# Patient Record
Sex: FEMALE | Race: BLACK OR AFRICAN AMERICAN
[De-identification: names, ages, dates, MRNs, and addresses within clinical notes are randomized per-mention and may not be internally consistent; named-entity substitution may affect disease eponyms.]

---

## 2017-05-02 ENCOUNTER — HOSPITAL ENCOUNTER (INPATIENT)
Dept: HOSPITAL 54 - ER | Age: 62
LOS: 1 days | Discharge: HOME | DRG: 205 | End: 2017-05-03
Attending: INTERNAL MEDICINE | Admitting: INTERNAL MEDICINE
Payer: MEDICARE

## 2017-05-02 VITALS — DIASTOLIC BLOOD PRESSURE: 63 MMHG | SYSTOLIC BLOOD PRESSURE: 115 MMHG

## 2017-05-02 VITALS — SYSTOLIC BLOOD PRESSURE: 99 MMHG | DIASTOLIC BLOOD PRESSURE: 54 MMHG

## 2017-05-02 VITALS — HEIGHT: 64 IN | WEIGHT: 152 LBS | BODY MASS INDEX: 25.95 KG/M2

## 2017-05-02 VITALS — SYSTOLIC BLOOD PRESSURE: 134 MMHG | DIASTOLIC BLOOD PRESSURE: 70 MMHG

## 2017-05-02 VITALS — DIASTOLIC BLOOD PRESSURE: 70 MMHG | SYSTOLIC BLOOD PRESSURE: 132 MMHG

## 2017-05-02 VITALS — SYSTOLIC BLOOD PRESSURE: 118 MMHG | DIASTOLIC BLOOD PRESSURE: 60 MMHG

## 2017-05-02 DIAGNOSIS — N18.6: ICD-10-CM

## 2017-05-02 DIAGNOSIS — D63.8: ICD-10-CM

## 2017-05-02 DIAGNOSIS — E11.22: ICD-10-CM

## 2017-05-02 DIAGNOSIS — I24.9: ICD-10-CM

## 2017-05-02 DIAGNOSIS — M94.0: Primary | ICD-10-CM

## 2017-05-02 DIAGNOSIS — I12.0: ICD-10-CM

## 2017-05-02 DIAGNOSIS — F03.90: ICD-10-CM

## 2017-05-02 DIAGNOSIS — J44.9: ICD-10-CM

## 2017-05-02 DIAGNOSIS — Z99.2: ICD-10-CM

## 2017-05-02 DIAGNOSIS — Z86.73: ICD-10-CM

## 2017-05-02 LAB
APTT PPP: 91 SEC (ref 23–34)
BASOPHILS # BLD AUTO: 0 /CMM (ref 0–0.2)
BASOPHILS NFR BLD AUTO: 0.9 % (ref 0–2)
BUN SERPL-MCNC: 35 MG/DL (ref 7–18)
CALCIUM SERPL-MCNC: 9.2 MG/DL (ref 8.5–10.1)
CHLORIDE SERPL-SCNC: 103 MMOL/L (ref 98–107)
CHOLEST SERPL-MCNC: 213 MG/DL (ref ?–200)
CO2 SERPL-SCNC: 23 MMOL/L (ref 21–32)
CREAT SERPL-MCNC: 2.5 MG/DL (ref 0.6–1.3)
EOSINOPHIL # BLD AUTO: 0.4 /CMM (ref 0–0.7)
EOSINOPHIL NFR BLD AUTO: 6.4 % (ref 0–6)
GFR SERPLBLD BASED ON 1.73 SQ M-ARVRAT: 20 ML/MIN (ref 60–?)
GLUCOSE SERPL-MCNC: 206 MG/DL (ref 74–106)
HCT VFR BLD AUTO: 40 % (ref 33–45)
HDLC SERPL-MCNC: 46 MG/DL (ref 40–60)
HGB BLD-MCNC: 13.1 G/DL (ref 11.5–14.8)
INR PPP: 1.06 (ref 0.87–1.13)
LDLC SERPL DIRECT ASSAY-MCNC: 113 MG/DL (ref 0–99)
LYMPHOCYTES NFR BLD AUTO: 1.3 /CMM (ref 0.8–4.8)
LYMPHOCYTES NFR BLD AUTO: 23.9 % (ref 20–44)
MAGNESIUM SERPL-MCNC: 2 MG/DL (ref 1.8–2.4)
MCH RBC QN AUTO: 31 PG (ref 26–33)
MCHC RBC AUTO-ENTMCNC: 33 G/DL (ref 31–36)
MCV RBC AUTO: 95 FL (ref 82–100)
MONOCYTES NFR BLD AUTO: 0.6 /CMM (ref 0.1–1.3)
MONOCYTES NFR BLD AUTO: 11.4 % (ref 2–12)
NEUTROPHILS # BLD AUTO: 3.1 /CMM (ref 1.8–8.9)
NEUTROPHILS NFR BLD AUTO: 57.4 % (ref 43–81)
PHOSPHATE SERPL-MCNC: 3 MG/DL (ref 2.5–4.9)
PLATELET # BLD AUTO: 206 /CMM (ref 150–450)
POTASSIUM SERPL-SCNC: 4 MMOL/L (ref 3.5–5.1)
PROTHROMBIN TIME: 11.4 SECS (ref 9.5–12.7)
RBC # BLD AUTO: 4.19 MIL/UL (ref 4–5.2)
RDW COEFFICIENT OF VARIATION: 14.1 (ref 11.5–15)
SODIUM SERPL-SCNC: 138 MMOL/L (ref 136–145)
TRIGL SERPL-MCNC: 225 MG/DL (ref 30–150)
TROPONIN I SERPL-MCNC: 0.03 NG/ML (ref 0–0.06)
TSH SERPL DL<=0.005 MIU/L-ACNC: 1.17 UIU/ML (ref 0.36–3.74)
WBC NRBC COR # BLD AUTO: 5.5 K/UL (ref 4.3–11)

## 2017-05-02 PROCEDURE — Z7610: HCPCS

## 2017-05-02 PROCEDURE — A4606 OXYGEN PROBE USED W OXIMETER: HCPCS

## 2017-05-02 RX ADMIN — MEMANTINE HYDROCHLORIDE SCH MG: 5 TABLET ORAL at 17:12

## 2017-05-02 RX ADMIN — HEPARIN SODIUM SCH UNITS: 5000 INJECTION INTRAVENOUS; SUBCUTANEOUS at 13:17

## 2017-05-02 RX ADMIN — CARVEDILOL SCH MG: 12.5 TABLET, FILM COATED ORAL at 21:00

## 2017-05-02 RX ADMIN — GABAPENTIN SCH MG: 300 CAPSULE ORAL at 13:14

## 2017-05-02 RX ADMIN — Medication SCH EACH: at 17:12

## 2017-05-02 RX ADMIN — LEVETIRACETAM SCH MG: 250 TABLET, FILM COATED ORAL at 21:23

## 2017-05-02 RX ADMIN — MEMANTINE HYDROCHLORIDE SCH MG: 5 TABLET ORAL at 13:16

## 2017-05-02 RX ADMIN — INSULIN HUMAN PRN UNIT: 100 INJECTION, SOLUTION PARENTERAL at 13:18

## 2017-05-02 RX ADMIN — HEPARIN SODIUM SCH UNITS: 5000 INJECTION INTRAVENOUS; SUBCUTANEOUS at 21:24

## 2017-05-02 RX ADMIN — GABAPENTIN SCH MG: 300 CAPSULE ORAL at 17:12

## 2017-05-02 RX ADMIN — INSULIN HUMAN PRN UNIT: 100 INJECTION, SOLUTION PARENTERAL at 17:15

## 2017-05-02 RX ADMIN — INSULIN HUMAN PRN UNIT: 100 INJECTION, SOLUTION PARENTERAL at 21:45

## 2017-05-02 RX ADMIN — Medication SCH EACH: at 13:16

## 2017-05-02 RX ADMIN — Medication SCH EACH: at 21:43

## 2017-05-02 RX ADMIN — AMLODIPINE BESYLATE SCH MG: 5 TABLET ORAL at 10:00

## 2017-05-02 NOTE — NUR
RN TELE CLOSING NOTES



ALL NEEDS PROVIDED, ATTENDED, AND ANTICIPATED. PATIENT, AWAKE, HEAD OF BED ELEVATED, NO SOB 
OR DISTRESS NOTED. PATIENT ON TELE MONITOR SINUS RHYTHM 64. A/O X3 , VERBALLY RESPONSIVE AND 
ABLE TO MAKE NEEDS KNOWN. IV INTACT, PATENT AND WITHOUT INFILTRATION. KEPT PATIENT CLEAN AND 
COMFORTABLE IN BED, CALL LIGHT WITHIN PATIENT REACH. ENDORSED TO NEXT SHIFT RN TO CONTINUE 
CARE.

## 2017-05-02 NOTE — NUR
TELE RN NOTES

RECEIVED ON BED A/O X2-3,BREATHING REGULAR,NOT IN ANY FORM OF DISTRESS.DENIES CHEST PAIN AT 
THE MOMENT,WITH RIGHT UPPER CHEST PETEY CATH FOR HD ACCESS,DRESSING CLEAN AND DRY.WITH 
SALINE LOCK LFA INTACT AND PATENT.FAMILY MEMBER AT BEDSIDE.CALL LIGHT IN REACH,NEEDS 
ANTICIPATED.

## 2017-05-02 NOTE — NUR
TELE RN NOTES  ACCU-CHECK

BLOOD SUGAR CHECK 212,COVERED WITH HUMULIN R 4 UNITS PER SLIDING SCALE,ALONG WITH LEVEMIR 25 
UNITS Q HS.SNACKS PROVIDED AT BEDSIDE.

## 2017-05-02 NOTE — NUR
RN NOTES



DOCTOR RENU MADE AWARE OF PATIENT ARRIVAL TO UNIT WITH ADMITTING ORDERS. WILL CARRY 
OUT AND CONTINUE TO MONITOR.

## 2017-05-02 NOTE — NUR
PT W/C TO ER BED 7 C/O "NEEDLE POKING" LIKE PAIN, LEFT CHEST X 1 DAY WITH 
DIZZINESS. PT AOX4 RR EVEN AND UNLABORED. NO SOB NOTED. NAD NOTED. NO NVD AT 
THIS TIME. PT NOT DIAPHORETIC. PT GOWNED AND PLACED ON MONITOR WAITING FOR MD KESSLER.

## 2017-05-02 NOTE — NUR
RN TELE OPENING NOTES



RECEIVED PATIENT IN BED, AWAKE, HEAD OF BED ELEVATED, NO SOB OR DISTRESS NOTED. PATIENT ON 
TELE MONITOR SINUS RHYTHM 64. A/O X3 , VERBALLY RESPONSIVE AND ABLE TO MAKE NEEDS KNOWN. IV 
INTACT, PATENT AND WITHOUT INFILTRATION. KEPT PATIENT CLEAN AND COMFORTABLE IN BED, CALL 
LIGHT WITHIN PATIENT REACH. WILL CONTINUE TO MONITOR ACCORDINGLY.

## 2017-05-03 VITALS — SYSTOLIC BLOOD PRESSURE: 128 MMHG | DIASTOLIC BLOOD PRESSURE: 70 MMHG

## 2017-05-03 VITALS — DIASTOLIC BLOOD PRESSURE: 64 MMHG | SYSTOLIC BLOOD PRESSURE: 122 MMHG

## 2017-05-03 VITALS — SYSTOLIC BLOOD PRESSURE: 117 MMHG | DIASTOLIC BLOOD PRESSURE: 65 MMHG

## 2017-05-03 VITALS — DIASTOLIC BLOOD PRESSURE: 54 MMHG | SYSTOLIC BLOOD PRESSURE: 136 MMHG

## 2017-05-03 LAB
ALBUMIN SERPL BCP-MCNC: 3.4 G/DL (ref 3.4–5)
ALP SERPL-CCNC: 104 U/L (ref 46–116)
ALT SERPL W P-5'-P-CCNC: 52 U/L (ref 12–78)
APPEARANCE UR: CLEAR
AST SERPL W P-5'-P-CCNC: 33 U/L (ref 15–37)
BACTERIA UR CULT: YES
BASOPHILS # BLD AUTO: 0 /CMM (ref 0–0.2)
BASOPHILS NFR BLD AUTO: 1 % (ref 0–2)
BILIRUB SERPL-MCNC: 0.2 MG/DL (ref 0.2–1)
BILIRUB UR QL STRIP: NEGATIVE
BUN SERPL-MCNC: 41 MG/DL (ref 7–18)
CALCIUM SERPL-MCNC: 8.7 MG/DL (ref 8.5–10.1)
CHLORIDE SERPL-SCNC: 106 MMOL/L (ref 98–107)
CO2 SERPL-SCNC: 25 MMOL/L (ref 21–32)
COLOR UR: YELLOW
CREAT SERPL-MCNC: 2.8 MG/DL (ref 0.6–1.3)
DEPRECATED SQUAMOUS URNS QL MICRO: (no result) /HPF
EOSINOPHIL # BLD AUTO: 0.3 /CMM (ref 0–0.7)
EOSINOPHIL NFR BLD AUTO: 7.9 % (ref 0–6)
GFR SERPLBLD BASED ON 1.73 SQ M-ARVRAT: 21 ML/MIN (ref 60–?)
GLUCOSE SERPL-MCNC: 75 MG/DL (ref 74–106)
GLUCOSE UR STRIP-MCNC: NEGATIVE MG/DL
HCT VFR BLD AUTO: 36 % (ref 33–45)
HGB BLD-MCNC: 11.9 G/DL (ref 11.5–14.8)
HGB UR QL STRIP: (no result) ERY/UL
KETONES UR STRIP-MCNC: NEGATIVE MG/DL
LEUKOCYTE ESTERASE UR QL STRIP: NEGATIVE
LYMPHOCYTES NFR BLD AUTO: 1.6 /CMM (ref 0.8–4.8)
LYMPHOCYTES NFR BLD AUTO: 36.3 % (ref 20–44)
MAGNESIUM SERPL-MCNC: 2 MG/DL (ref 1.8–2.4)
MCH RBC QN AUTO: 32 PG (ref 26–33)
MCHC RBC AUTO-ENTMCNC: 34 G/DL (ref 31–36)
MCV RBC AUTO: 94 FL (ref 82–100)
MONOCYTES NFR BLD AUTO: 0.7 /CMM (ref 0.1–1.3)
MONOCYTES NFR BLD AUTO: 15.1 % (ref 2–12)
NEUTROPHILS # BLD AUTO: 1.7 /CMM (ref 1.8–8.9)
NEUTROPHILS NFR BLD AUTO: 39.7 % (ref 43–81)
NITRITE UR QL STRIP: NEGATIVE
PH UR STRIP: 5 [PH] (ref 5–8)
PHOSPHATE SERPL-MCNC: 3.3 MG/DL (ref 2.5–4.9)
PLATELET # BLD AUTO: 192 /CMM (ref 150–450)
POTASSIUM SERPL-SCNC: 4.4 MMOL/L (ref 3.5–5.1)
PROT SERPL-MCNC: 7.7 G/DL (ref 6.4–8.2)
PROT UR QL STRIP: (no result) MG/DL
RBC # BLD AUTO: 3.78 MIL/UL (ref 4–5.2)
RBC #/AREA URNS HPF: (no result) /HPF (ref 0–2)
RDW COEFFICIENT OF VARIATION: 14.1 (ref 11.5–15)
SODIUM SERPL-SCNC: 138 MMOL/L (ref 136–145)
SP GR UR STRIP: 1.01 (ref 1–1.03)
TROPONIN I SERPL-MCNC: 0.02 NG/ML (ref 0–0.06)
UROBILINOGEN UR STRIP-MCNC: 0.2 EU/DL
WBC #/AREA URNS HPF: (no result) /HPF
WBC #/AREA URNS HPF: (no result) /HPF (ref 0–3)
WBC NRBC COR # BLD AUTO: 4.4 K/UL (ref 4.3–11)

## 2017-05-03 RX ADMIN — Medication SCH EACH: at 11:53

## 2017-05-03 RX ADMIN — INSULIN HUMAN PRN UNIT: 100 INJECTION, SOLUTION PARENTERAL at 12:01

## 2017-05-03 RX ADMIN — CARVEDILOL SCH MG: 12.5 TABLET, FILM COATED ORAL at 08:13

## 2017-05-03 RX ADMIN — LEVETIRACETAM SCH MG: 250 TABLET, FILM COATED ORAL at 08:12

## 2017-05-03 RX ADMIN — Medication SCH EACH: at 05:34

## 2017-05-03 RX ADMIN — AMLODIPINE BESYLATE SCH MG: 5 TABLET ORAL at 08:14

## 2017-05-03 RX ADMIN — GABAPENTIN SCH MG: 300 CAPSULE ORAL at 12:21

## 2017-05-03 RX ADMIN — MEMANTINE HYDROCHLORIDE SCH MG: 5 TABLET ORAL at 08:13

## 2017-05-03 RX ADMIN — GABAPENTIN SCH MG: 300 CAPSULE ORAL at 08:14

## 2017-05-03 RX ADMIN — HEPARIN SODIUM SCH UNITS: 5000 INJECTION INTRAVENOUS; SUBCUTANEOUS at 08:17

## 2017-05-03 NOTE — NUR
TELE RN INITIAL NOTES

REPORT RECEIVED AT THE BEDSIDE. PATIENT IS RESTING COMFORTABLY IN BED. NO SOB OR DISTRESS 
NOTED AT THIS TIME. PATIENT DENIES PAIN. HEART RATE IS SR 64. BED IN A LOW POSITION, CALL 
LIGHT WITHIN PATIENT REACH, FAMILY IS AT THE BEDSIDE. WILL CONTINUE TO MONITOR.

## 2017-05-03 NOTE — NUR
TELE RN NOTES

SLEPT WELL AT [facility],NO CHEST PAIN,BLOOD SUGAR WITH IN NORMAL LIMITS.IN NO ACUTE DISTRESS,NEEDS 
ATTENDED.WILL ENDORSE TO DAY NURSE FOR CARMELINA.

## 2017-05-03 NOTE — NUR
MS RN DISCHARGE NOTE

DISCHARGE INSTRUCTIONS GIVEN TO PATIENT AND DAUGHTER AND ABLE TO UNDERSTAND. ALL PAPERWORK 
SIGNED AND BELONGINGS ACCOUNTED FOR. VITAL SIGNS CHECKED AND RECORDED. IV DISCONNECTED AND 
PRESSURE APPLIED. NO BLEEDING NOTED AT THE SITE. PICTURES OF SKIN NOT NEEDED AS PATIENT SKIN 
IS INTACT. FLU AND PNEUMONIA VACCINES NOT GIVEN DUE TO BEING OUT OF SEASON AND PATIENT AGE 
LESS THAN 66. PATIENT IS BEING DISCHARGED HOME, VIA PRIVATE CAR WITH DAUGHTER. PATIENT LEFT 
VIA WHEEL CHAIR WITH TIERRA HARTMANN. NO SOB OR DISTRESS NOTED. PATIENT LEFT IN STABLE CONDITION.

## 2017-08-02 ENCOUNTER — OFFICE (OUTPATIENT)
Dept: URBAN - METROPOLITAN AREA CLINIC 67 | Facility: CLINIC | Age: 62
End: 2017-08-02

## 2017-08-02 VITALS — WEIGHT: 142 LBS | DIASTOLIC BLOOD PRESSURE: 65 MMHG | HEIGHT: 64 IN | SYSTOLIC BLOOD PRESSURE: 104 MMHG

## 2017-08-02 DIAGNOSIS — R10.84 GENERALIZED ABDOMINAL PAIN: ICD-10-CM

## 2017-08-02 DIAGNOSIS — Z12.11 SCREENING FOR COLONIC NEOPLASIA: ICD-10-CM

## 2017-08-02 PROCEDURE — 99214 OFFICE O/P EST MOD 30 MIN: CPT | Performed by: INTERNAL MEDICINE

## 2017-08-02 NOTE — SERVICEHPINOTES
The patient complains of abdominal pain.    Symptoms began   a few  months   ago with onset that was    intermittent  .    It is localized as   upper abdomen   pain.    It is described as   moderate   in nature.   Pain quality is described as   crampy and Pressure  .    It also radiates to the   .    Discomfort typically lasts   several  minutes   and has a course which is   .   It usually starts   .    Symptom triggers include   any food intake  .  Alleviating factors include   .    Symptoms have been    since onset.    Alarm features noted:   .   The patient also reports   .      Symptoms have caused the patient to    Similar symptoms    occurred in the past, associated with    .   Noteworthy prior abdominal interventions include   .   has been performed previously to evaluate the patient's symptoms.   Remedies tried to date include     with    .    Other pertinent testing to date includes,

## 2019-01-08 ENCOUNTER — OFFICE (OUTPATIENT)
Dept: URBAN - METROPOLITAN AREA CLINIC 66 | Facility: CLINIC | Age: 64
End: 2019-01-08

## 2019-01-08 VITALS — DIASTOLIC BLOOD PRESSURE: 70 MMHG | SYSTOLIC BLOOD PRESSURE: 118 MMHG | HEIGHT: 64 IN

## 2019-01-08 DIAGNOSIS — J40 BRONCHITIS: ICD-10-CM

## 2019-01-08 DIAGNOSIS — R11.2 NAUSEA & VOMITING: ICD-10-CM

## 2019-01-08 DIAGNOSIS — N18.6 END STAGE RENAL FAILURE ON DIALYSIS: ICD-10-CM

## 2019-01-08 PROCEDURE — 99214 OFFICE O/P EST MOD 30 MIN: CPT | Performed by: INTERNAL MEDICINE

## 2019-01-08 NOTE — SERVICEHPINOTES
Regarding   epigastric region   abdominal pain, the most likely etiology considered thus far has been   .   Since last visit the patient states the pain has   .    Medication classes tried so far include   PPI therapy  .      It is currently   moderate   in severity.     It is   crampy   in quality.    It also radiates to the   .     Abdominal pain triggers include   .    Symptoms are alleviated by   .    The patient also reports   nausea and vomiting  .   Ongoing alarm symptoms:   intermittent dark stool  .    Abdominal pain has been severe enough to cause the patient to   .    The workup has thus far included   .

## 2019-01-09 LAB
CBC (INCLUDES DIFF/PLT): ABSOLUTE BAND NEUTROPHILS: NORMAL CELLS/UL
CBC (INCLUDES DIFF/PLT): ABSOLUTE BASOPHILS: 57 CELLS/UL (ref 0–200)
CBC (INCLUDES DIFF/PLT): ABSOLUTE BLASTS: NORMAL CELLS/UL
CBC (INCLUDES DIFF/PLT): ABSOLUTE EOSINOPHILS: 230 CELLS/UL (ref 15–500)
CBC (INCLUDES DIFF/PLT): ABSOLUTE LYMPHOCYTES: 2042 CELLS/UL (ref 850–3900)
CBC (INCLUDES DIFF/PLT): ABSOLUTE METAMYELOCYTES: NORMAL CELLS/UL
CBC (INCLUDES DIFF/PLT): ABSOLUTE MONOCYTES: 623 CELLS/UL (ref 200–950)
CBC (INCLUDES DIFF/PLT): ABSOLUTE MYELOCYTES: NORMAL CELLS/UL
CBC (INCLUDES DIFF/PLT): ABSOLUTE NEUTROPHILS: 5248 CELLS/UL (ref 1500–7800)
CBC (INCLUDES DIFF/PLT): ABSOLUTE NUCLEATED RBC: 0 CELLS/UL (ref 0–?)
CBC (INCLUDES DIFF/PLT): ABSOLUTE PROMYELOCYTES: NORMAL CELLS/UL
CBC (INCLUDES DIFF/PLT): BAND NEUTROPHILS: NORMAL %
CBC (INCLUDES DIFF/PLT): BASOPHILS: 0.7 %
CBC (INCLUDES DIFF/PLT): BLASTS: NORMAL %
CBC (INCLUDES DIFF/PLT): COMMENT(S): NORMAL
CBC (INCLUDES DIFF/PLT): EOSINOPHILS: 2.8 %
CBC (INCLUDES DIFF/PLT): HEMATOCRIT: 35.7 % (ref 35–45)
CBC (INCLUDES DIFF/PLT): HEMOGLOBIN: 12 G/DL (ref 11.7–15.5)
CBC (INCLUDES DIFF/PLT): LYMPHOCYTES: 24.9 %
CBC (INCLUDES DIFF/PLT): MCH: 32 PG (ref 27–33)
CBC (INCLUDES DIFF/PLT): MCHC: 33.6 G/DL (ref 32–36)
CBC (INCLUDES DIFF/PLT): MCV: 95.2 FL (ref 80–100)
CBC (INCLUDES DIFF/PLT): METAMYELOCYTES: NORMAL %
CBC (INCLUDES DIFF/PLT): MONOCYTES: 7.6 %
CBC (INCLUDES DIFF/PLT): MPV: 10.4 FL (ref 7.5–12.5)
CBC (INCLUDES DIFF/PLT): MYELOCYTES: NORMAL %
CBC (INCLUDES DIFF/PLT): NEUTROPHILS: 64 %
CBC (INCLUDES DIFF/PLT): NUCLEATED RBC: NORMAL /100 WBC
CBC (INCLUDES DIFF/PLT): PLATELET COUNT: 252 THOUSAND/UL (ref 140–400)
CBC (INCLUDES DIFF/PLT): PROMYELOCYTES: NORMAL %
CBC (INCLUDES DIFF/PLT): RDW: 13.7 % (ref 11–15)
CBC (INCLUDES DIFF/PLT): REACTIVE LYMPHOCYTES: NORMAL %
CBC (INCLUDES DIFF/PLT): RED BLOOD CELL COUNT: 3.75 MILLION/UL — LOW (ref 3.8–5.1)
CBC (INCLUDES DIFF/PLT): WHITE BLOOD CELL COUNT: 8.2 THOUSAND/UL (ref 3.8–10.8)
COMPREHENSIVE METABOLIC PANEL: ALBUMIN/GLOBULIN RATIO: 0.9 (CALC) — LOW (ref 1–2.5)
COMPREHENSIVE METABOLIC PANEL: ALBUMIN: 3.6 G/DL (ref 3.6–5.1)
COMPREHENSIVE METABOLIC PANEL: ALKALINE PHOSPHATASE: 98 U/L (ref 33–130)
COMPREHENSIVE METABOLIC PANEL: ALT: 21 U/L (ref 6–29)
COMPREHENSIVE METABOLIC PANEL: AST: 13 U/L (ref 10–35)
COMPREHENSIVE METABOLIC PANEL: BILIRUBIN, TOTAL: 0.3 MG/DL (ref 0.2–1.2)
COMPREHENSIVE METABOLIC PANEL: BUN/CREATININE RATIO: 10 (CALC) (ref 6–22)
COMPREHENSIVE METABOLIC PANEL: CALCIUM: 8.9 MG/DL (ref 8.6–10.4)
COMPREHENSIVE METABOLIC PANEL: CARBON DIOXIDE: 25 MMOL/L (ref 20–32)
COMPREHENSIVE METABOLIC PANEL: CHLORIDE: 101 MMOL/L (ref 98–110)
COMPREHENSIVE METABOLIC PANEL: CREATININE: 2.83 MG/DL — HIGH (ref 0.5–0.99)
COMPREHENSIVE METABOLIC PANEL: EGFR AFRICAN AMERICAN: 20 ML/MIN/1.73M2 — LOW (ref 60–?)
COMPREHENSIVE METABOLIC PANEL: EGFR NON-AFR. AMERICAN: 17 ML/MIN/1.73M2 — LOW (ref 60–?)
COMPREHENSIVE METABOLIC PANEL: GLOBULIN: 4.2 G/DL (CALC) — HIGH (ref 1.9–3.7)
COMPREHENSIVE METABOLIC PANEL: GLUCOSE: 133 MG/DL — HIGH (ref 65–99)
COMPREHENSIVE METABOLIC PANEL: POTASSIUM: 4.8 MMOL/L (ref 3.5–5.3)
COMPREHENSIVE METABOLIC PANEL: PROTEIN, TOTAL: 7.8 G/DL (ref 6.1–8.1)
COMPREHENSIVE METABOLIC PANEL: SODIUM: 136 MMOL/L (ref 135–146)
COMPREHENSIVE METABOLIC PANEL: UREA NITROGEN (BUN): 28 MG/DL — HIGH (ref 7–25)
LIPASE: 34 U/L (ref 7–60)

## 2022-01-04 ENCOUNTER — HOSPITAL ENCOUNTER (OUTPATIENT)
Dept: HOSPITAL 54 - MSC | Age: 67
Discharge: HOME | End: 2022-01-04
Attending: INTERNAL MEDICINE
Payer: MEDICARE

## 2022-01-04 DIAGNOSIS — R25.3: ICD-10-CM

## 2022-01-04 DIAGNOSIS — G62.9: ICD-10-CM

## 2022-01-04 DIAGNOSIS — Z79.4: ICD-10-CM

## 2022-01-04 DIAGNOSIS — Z99.2: ICD-10-CM

## 2022-01-04 DIAGNOSIS — I12.0: ICD-10-CM

## 2022-01-04 DIAGNOSIS — N18.6: ICD-10-CM

## 2022-01-04 DIAGNOSIS — R60.1: ICD-10-CM

## 2022-01-04 DIAGNOSIS — I62.00: ICD-10-CM

## 2022-01-04 DIAGNOSIS — J44.9: ICD-10-CM

## 2022-01-04 DIAGNOSIS — E11.22: ICD-10-CM

## 2022-01-04 DIAGNOSIS — U07.1: Primary | ICD-10-CM

## 2024-12-03 ENCOUNTER — OFFICE (OUTPATIENT)
Dept: URBAN - METROPOLITAN AREA CLINIC 67 | Facility: CLINIC | Age: 69
End: 2024-12-03

## 2024-12-03 VITALS — WEIGHT: 172 LBS | SYSTOLIC BLOOD PRESSURE: 130 MMHG | HEIGHT: 64 IN | DIASTOLIC BLOOD PRESSURE: 70 MMHG

## 2024-12-03 DIAGNOSIS — N18.6 ESRD ON HEMODIALYSIS: ICD-10-CM

## 2024-12-03 DIAGNOSIS — R10.84 GENERALIZED ABDOMINAL PAIN: ICD-10-CM

## 2024-12-03 DIAGNOSIS — Z86.0100 PERSONAL HISTORY OF COLON POLYPS, UNSPECIFIED: ICD-10-CM

## 2024-12-03 DIAGNOSIS — R13.10 DYSPHAGIA: ICD-10-CM

## 2024-12-03 DIAGNOSIS — K59.09 CHRONIC CONSTIPATION: ICD-10-CM

## 2024-12-03 DIAGNOSIS — Z79.01 LONG-TERM (CURRENT) USE OF ANTICOAGULANTS: ICD-10-CM

## 2024-12-03 DIAGNOSIS — Z86.73 STATUS POST CVA: ICD-10-CM

## 2024-12-03 PROCEDURE — 99204 OFFICE O/P NEW MOD 45 MIN: CPT | Performed by: NURSE PRACTITIONER

## 2024-12-03 NOTE — SERVICEHPINOTES
This is a   69   year old  female   seen   in consultation at the request of Dr. KYREE RODRIGUEZ  . Last seen in the office but evaluated at [facility] in 2021 and 2023 for GI issues. Pt in wheelchair, some speech impediment,  accompanied by daughter . Due For colonoscopy because a 9 2021 when she underwent both an endoscopy and a colonoscopy for iron deficiency anemia she had acute colitis/ischemic colitis a tubular adenoma and a fair prep so a three-year follow-up was recommended by Dr. Freedman.  An endoscopy in July 2023 for epigastric pain by Dr. Rosario was unremarkable other than food in the stomach No biopsies taken as patient was on anticoagulation Subsequent to gastric emptying study was positive for gastroparesis.  Esophagus was normal.
br
br   Patient/daughter reports swallowing issues manifested by food getting stuck in the esophagus as well as coughing and choking at times but liquids as well. Daughter says that she was symptomatic before her last endoscopy. She also reports abdominal pain from epigastric area to suprapubic region and jumps in the chair when daughter touches her abdomen lightly. Patient says she feels spasms as well in the upper abdomen. According to daughter she is maintained on daily omeprazole unknown dose per PCP.
br
br
Patient on polypharmacy for her multiple comorbidities.  She gets dialysis through subclavian catheter port Monday Wednesday Friday.